# Patient Record
Sex: FEMALE | Race: WHITE | URBAN - METROPOLITAN AREA
[De-identification: names, ages, dates, MRNs, and addresses within clinical notes are randomized per-mention and may not be internally consistent; named-entity substitution may affect disease eponyms.]

---

## 2019-10-19 ENCOUNTER — EMERGENCY (EMERGENCY)
Facility: HOSPITAL | Age: 19
LOS: 0 days | Discharge: HOME | End: 2019-10-19
Attending: EMERGENCY MEDICINE | Admitting: EMERGENCY MEDICINE
Payer: COMMERCIAL

## 2019-10-19 VITALS
TEMPERATURE: 99 F | RESPIRATION RATE: 19 BRPM | SYSTOLIC BLOOD PRESSURE: 151 MMHG | OXYGEN SATURATION: 98 % | WEIGHT: 107.14 LBS | DIASTOLIC BLOOD PRESSURE: 88 MMHG | HEART RATE: 120 BPM

## 2019-10-19 VITALS — DIASTOLIC BLOOD PRESSURE: 78 MMHG | HEART RATE: 107 BPM | SYSTOLIC BLOOD PRESSURE: 123 MMHG | OXYGEN SATURATION: 100 %

## 2019-10-19 DIAGNOSIS — F41.9 ANXIETY DISORDER, UNSPECIFIED: ICD-10-CM

## 2019-10-19 DIAGNOSIS — R00.2 PALPITATIONS: ICD-10-CM

## 2019-10-19 PROCEDURE — 99283 EMERGENCY DEPT VISIT LOW MDM: CPT

## 2019-10-19 NOTE — ED PROVIDER NOTE - NS ED ROS FT
Constitutional: See HPI.  Eyes: No visual changes  ENMT: No neck pain   Cardiac: No cp + palpitations  Respiratory: No cough   GI: No nausea, vomiting, diarrhea or abdominal pain.  MS: No myalgia, muscle weakness, joint pain or back pain.  Psych: see hpi  Neuro: No headache   Skin: No skin rash.

## 2019-10-19 NOTE — ED PEDIATRIC TRIAGE NOTE - CHIEF COMPLAINT QUOTE
Patient brought in with mother for c/o increased anxiety and feeling like she cannot breath. Patient with history of anxiety on Zoloft recently changed to 100mg daily over the past month. Patient with no signs of respiratory distress. Speaking in full sentences in triage. Patient reports no homicidal or suicidal ideation.

## 2019-10-19 NOTE — ED PROVIDER NOTE - PROGRESS NOTE DETAILS
ATTENDING NOTE: I personally evaluated the patient. I reviewed the Resident’s note (as assigned above), and agree with the findings and plan except as documented in my note.   18 y/o F with PMH of anxiety on Zoloft, panic attacks, and asthma presents to ED s/p episode of anxiety. Pt endorses that she feels more stressed lately secondary to her social situation but is reluctant to provide details. Pt reports that “a lot is happening in my mind.”  States that her anxiety has improved while on Zoloft but she still has episodes of anxiety sometimes. Pt follows with Dr. Burks. Denies SI/HI; all other ROS is negative.   CONSTITUTIONAL: Well-developed; well-nourished; in no acute distress. NEURO: Alert, oriented, grossly unremarkable. PSYCH: Cooperative, appropriate.. SKIN: warm, dry. HEAD: Normocephalic; atraumatic. EYES: PERRL, EOMI, no conjunctival erythema. ENT: No nasal discharge; airway clear. NECK: Supple; non tender. CARD: S1, S2 normal;  Regular rate and rhythm. RESP: No wheezes, rales or rhonchi. ABD: soft non tender, non distended, no rebound or guarding. EXT: Normal ROM. LYMPH: No acute cervical lymphadenopathy. PSYCH: No affects, good eye contact, answers questions appropriately.   Plan for psych evaluation. patient no longer requests to see a physiatrist, stating feels better wants to go home.

## 2019-10-19 NOTE — ED PROVIDER NOTE - ATTENDING CONTRIBUTION TO CARE
I personally evaluated the patient. I reviewed the Resident’s note (as assigned above), and agree with the findings and plan except as documented in my note.     20 y/o F with PMH of anxiety on Zoloft, panic attacks, and asthma presents to ED s/p episode of anxiety. Pt endorses that she feels more stressed lately secondary to her social situation but is reluctant to provide details. Pt reports that “a lot is happening in my mind.”  States that her anxiety has improved while on Zoloft but she still has episodes of anxiety sometimes. Pt follows with Dr. Burks. Denies SI/HI; all other ROS is negative.     CONSTITUTIONAL: Well-developed; well-nourished; in no acute distress. NEURO: Alert, oriented, grossly unremarkable. PSYCH: Cooperative, appropriate.. SKIN: warm, dry. HEAD: Normocephalic; atraumatic. EYES: PERRL, EOMI, no conjunctival erythema. ENT: No nasal discharge; airway clear. NECK: Supple; non tender. CARD: S1, S2 normal;  Regular rate and rhythm. RESP: No wheezes, rales or rhonchi. ABD: soft non tender, non distended, no rebound or guarding. EXT: Normal ROM. LYMPH: No acute cervical lymphadenopathy. PSYCH: No affects, good eye contact, answers questions appropriately.   Plan for psych evaluation.

## 2019-10-19 NOTE — ED PROVIDER NOTE - PATIENT PORTAL LINK FT
You can access the FollowMyHealth Patient Portal offered by [facility] by registering at the following website: [email or web address]. By joining [employer]’s FollowMyHealth portal, you will also be able to view your health information using other applications (apps) compatible with our system.

## 2019-10-19 NOTE — ED PROVIDER NOTE - CARE PROVIDER_API CALL
Irene Burks (PS)  Psych  Inpatient  [address] [place], [place] [address]  Phone: [phone]  Fax: [phone]  Follow Up Time:

## 2019-10-19 NOTE — ED PROVIDER NOTE - NSFOLLOWUPINSTRUCTIONS_ED_ALL_ED_FT
Anxiety    Generalized anxiety disorder (JABIER) is a mental disorder. It is defined as anxiety that is not necessarily related to specific events or activities or is out of proportion to said events. Symptoms include restlessness, fatigue, difficulty concentrations, irritability and difficulty concentrating. It interferes with life functions, including relationships, work, and school. If you were started on a medication make sure to take exactly as prescribed and follow up with a psychiatrist.    SEEK IMMEDIATE MEDICAL CARE IF YOU HAVE THE FOLLOWING SYMPTOMS: thoughts about hurting killing yourself, thoughts about hurting or killing somebody else, hallucinations, or worsening depression.

## 2019-10-19 NOTE — ED PROVIDER NOTE - CLINICAL SUMMARY MEDICAL DECISION MAKING FREE TEXT BOX
Patient presents with episode of anxiety, feels like her typical episodes. Originally requesting ot talk to psychiatry but states that she would rather go home and see her person psychiatrist. no SI/HI. Return precautions discussed.
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
## 2020-07-01 ENCOUNTER — APPOINTMENT (OUTPATIENT)
Dept: OTOLARYNGOLOGY | Facility: CLINIC | Age: 20
End: 2020-07-01
Payer: COMMERCIAL

## 2020-07-01 PROBLEM — Z00.00 ENCOUNTER FOR PREVENTIVE HEALTH EXAMINATION: Status: ACTIVE | Noted: 2020-07-01

## 2020-07-01 PROCEDURE — 99203 OFFICE O/P NEW LOW 30 MIN: CPT

## 2020-07-01 NOTE — ASSESSMENT
[FreeTextEntry1] : Voice eval/videostroboscopy/GI f/u \par Head elevation at night\par PPI\par Endocrine evaluation\par f/u prn

## 2020-07-01 NOTE — REASON FOR VISIT
[FreeTextEntry2] : Referred by Dr Sharif with voice issue and thyroid nodules [Initial Consultation] : an initial consultation for

## 2020-07-01 NOTE — PHYSICAL EXAM
[de-identified] : prominent/borderline level II cervical lymph nodes bilaterally, thyroid slightly prominent and nodular [Midline] : trachea located in midline position [Normal] : no rashes

## 2020-07-01 NOTE — HISTORY OF PRESENT ILLNESS
[de-identified] : Performer/geller with several months voice issues [high register] and also thyroid nodules [US today 4 sub-centimeter thyroid nodule] and LN 1.5 cm with slight thyroid enlargment\par Hx esoiniphilic esophagitis\par Had recent laryngoscopy and scheduled for videostoboscopy to evaluate voice/vocal folds

## 2021-08-17 ENCOUNTER — TRANSCRIPTION ENCOUNTER (OUTPATIENT)
Age: 21
End: 2021-08-17

## 2022-03-18 ENCOUNTER — EMERGENCY (EMERGENCY)
Facility: HOSPITAL | Age: 22
LOS: 0 days | Discharge: HOME | End: 2022-03-19
Attending: STUDENT IN AN ORGANIZED HEALTH CARE EDUCATION/TRAINING PROGRAM | Admitting: STUDENT IN AN ORGANIZED HEALTH CARE EDUCATION/TRAINING PROGRAM
Payer: COMMERCIAL

## 2022-03-18 VITALS
HEART RATE: 127 BPM | SYSTOLIC BLOOD PRESSURE: 131 MMHG | TEMPERATURE: 99 F | WEIGHT: 110.01 LBS | RESPIRATION RATE: 18 BRPM | DIASTOLIC BLOOD PRESSURE: 71 MMHG | OXYGEN SATURATION: 100 %

## 2022-03-18 DIAGNOSIS — R51.9 HEADACHE, UNSPECIFIED: ICD-10-CM

## 2022-03-18 LAB
ALBUMIN SERPL ELPH-MCNC: 4.9 G/DL — SIGNIFICANT CHANGE UP (ref 3.5–5.2)
ALP SERPL-CCNC: 68 U/L — SIGNIFICANT CHANGE UP (ref 30–115)
ALT FLD-CCNC: 8 U/L — SIGNIFICANT CHANGE UP (ref 0–41)
ANION GAP SERPL CALC-SCNC: 11 MMOL/L — SIGNIFICANT CHANGE UP (ref 7–14)
AST SERPL-CCNC: 18 U/L — SIGNIFICANT CHANGE UP (ref 0–41)
BASOPHILS # BLD AUTO: 0.01 K/UL — SIGNIFICANT CHANGE UP (ref 0–0.2)
BASOPHILS NFR BLD AUTO: 0.1 % — SIGNIFICANT CHANGE UP (ref 0–1)
BILIRUB SERPL-MCNC: 0.2 MG/DL — SIGNIFICANT CHANGE UP (ref 0.2–1.2)
BUN SERPL-MCNC: 11 MG/DL — SIGNIFICANT CHANGE UP (ref 10–20)
CALCIUM SERPL-MCNC: 10.1 MG/DL — SIGNIFICANT CHANGE UP (ref 8.5–10.1)
CHLORIDE SERPL-SCNC: 103 MMOL/L — SIGNIFICANT CHANGE UP (ref 98–110)
CO2 SERPL-SCNC: 24 MMOL/L — SIGNIFICANT CHANGE UP (ref 17–32)
CREAT SERPL-MCNC: 0.6 MG/DL — LOW (ref 0.7–1.5)
EGFR: 130 ML/MIN/1.73M2 — SIGNIFICANT CHANGE UP
EOSINOPHIL # BLD AUTO: 0 K/UL — SIGNIFICANT CHANGE UP (ref 0–0.7)
EOSINOPHIL NFR BLD AUTO: 0 % — SIGNIFICANT CHANGE UP (ref 0–8)
GLUCOSE SERPL-MCNC: 104 MG/DL — HIGH (ref 70–99)
HCG SERPL QL: NEGATIVE — SIGNIFICANT CHANGE UP
HCT VFR BLD CALC: 33.8 % — LOW (ref 37–47)
HGB BLD-MCNC: 11.6 G/DL — LOW (ref 12–16)
IMM GRANULOCYTES NFR BLD AUTO: 0.2 % — SIGNIFICANT CHANGE UP (ref 0.1–0.3)
LYMPHOCYTES # BLD AUTO: 2.01 K/UL — SIGNIFICANT CHANGE UP (ref 1.2–3.4)
LYMPHOCYTES # BLD AUTO: 21.9 % — SIGNIFICANT CHANGE UP (ref 20.5–51.1)
MCHC RBC-ENTMCNC: 28.8 PG — SIGNIFICANT CHANGE UP (ref 27–31)
MCHC RBC-ENTMCNC: 34.3 G/DL — SIGNIFICANT CHANGE UP (ref 32–37)
MCV RBC AUTO: 83.9 FL — SIGNIFICANT CHANGE UP (ref 81–99)
MONOCYTES # BLD AUTO: 0.49 K/UL — SIGNIFICANT CHANGE UP (ref 0.1–0.6)
MONOCYTES NFR BLD AUTO: 5.3 % — SIGNIFICANT CHANGE UP (ref 1.7–9.3)
NEUTROPHILS # BLD AUTO: 6.65 K/UL — HIGH (ref 1.4–6.5)
NEUTROPHILS NFR BLD AUTO: 72.5 % — SIGNIFICANT CHANGE UP (ref 42.2–75.2)
NRBC # BLD: 0 /100 WBCS — SIGNIFICANT CHANGE UP (ref 0–0)
PLATELET # BLD AUTO: 247 K/UL — SIGNIFICANT CHANGE UP (ref 130–400)
POTASSIUM SERPL-MCNC: 4.7 MMOL/L — SIGNIFICANT CHANGE UP (ref 3.5–5)
POTASSIUM SERPL-SCNC: 4.7 MMOL/L — SIGNIFICANT CHANGE UP (ref 3.5–5)
PROT SERPL-MCNC: 7.2 G/DL — SIGNIFICANT CHANGE UP (ref 6–8)
RBC # BLD: 4.03 M/UL — LOW (ref 4.2–5.4)
RBC # FLD: 12.5 % — SIGNIFICANT CHANGE UP (ref 11.5–14.5)
SODIUM SERPL-SCNC: 138 MMOL/L — SIGNIFICANT CHANGE UP (ref 135–146)
WBC # BLD: 9.18 K/UL — SIGNIFICANT CHANGE UP (ref 4.8–10.8)
WBC # FLD AUTO: 9.18 K/UL — SIGNIFICANT CHANGE UP (ref 4.8–10.8)

## 2022-03-18 PROCEDURE — 70450 CT HEAD/BRAIN W/O DYE: CPT | Mod: 26,59,MA

## 2022-03-18 PROCEDURE — 93010 ELECTROCARDIOGRAM REPORT: CPT

## 2022-03-18 PROCEDURE — 99285 EMERGENCY DEPT VISIT HI MDM: CPT

## 2022-03-18 PROCEDURE — 70496 CT ANGIOGRAPHY HEAD: CPT | Mod: 26,MA

## 2022-03-18 RX ORDER — SODIUM CHLORIDE 9 MG/ML
1000 INJECTION, SOLUTION INTRAVENOUS ONCE
Refills: 0 | Status: COMPLETED | OUTPATIENT
Start: 2022-03-18 | End: 2022-03-18

## 2022-03-18 RX ORDER — HYDROXYZINE HCL 10 MG
25 TABLET ORAL ONCE
Refills: 0 | Status: COMPLETED | OUTPATIENT
Start: 2022-03-18 | End: 2022-03-18

## 2022-03-18 RX ADMIN — SODIUM CHLORIDE 1000 MILLILITER(S): 9 INJECTION, SOLUTION INTRAVENOUS at 21:38

## 2022-03-18 NOTE — ED ADULT TRIAGE NOTE - CHIEF COMPLAINT QUOTE
pt states has swollen optical gland with associated headaches and per opthalmologist today was told to come to ED for further testing

## 2022-03-18 NOTE — ED ADULT TRIAGE NOTE - WEIGHT IN KG
Last TSH WNL. A refill sent to pharmacy.  
Patient called and stated she needed a refill on her Euthyrox 75 mg for her thyroid. She is doing well with the new dosage but will need a refill as her previous prescription was for a lower daily dosage.   
49.9

## 2022-03-18 NOTE — ED PROVIDER NOTE - PROVIDER TOKENS
PROVIDER:[TOKEN:[[id number]],FOLLOWUP:[1-3 Days]],PROVIDER:[TOKEN:[[id number]],FOLLOWUP:[1-3 Days]]

## 2022-03-18 NOTE — ED PROVIDER NOTE - NSFOLLOWUPCLINICS_GEN_ALL_ED_FT
[facility]  Ophthalmolgy  [address], [address]  [place] [address]  Phone: [phone]  Fax:   Scheduled Appointment: 3/21/2022

## 2022-03-18 NOTE — ED PROVIDER NOTE - CLINICAL SUMMARY MEDICAL DECISION MAKING FREE TEXT BOX
20 yo female with PMH anxiety, hypothyroid, depression, asthma, tachycardia (has had cardiology eval, holter monitor etc, states its from her anxiety) presents to the ER from her optometrists for papilledema. Patient with unremarkable neurological exam. Labs and imaging reviewed. Neurological consulted. However, patient left AMA prior to final CT  venogram read and final neurology recommendations. Provided neurology follow up and strict return precautions discussed in detail.

## 2022-03-18 NOTE — ED ADULT NURSE NOTE - OBJECTIVE STATEMENT
patient reports she was sent in by her ophthalmologist for swollen optical gland. Patient complaints of headache

## 2022-03-18 NOTE — ED PROVIDER NOTE - NS ED ROS FT
GEN:  no fever, no chills, no generalized weakness  NEURO:  no headache, no dizziness, no vision changes  ENT: no sore throat, no runny nose  CV:  no chest pain  RESP:  no sob  GI:  no nausea, no vomiting, no abdominal pain, no diarrhea  MSK:  no joint pain, no edema  SKIN:  no rash, no bruising

## 2022-03-18 NOTE — ED PROVIDER NOTE - PATIENT PORTAL LINK FT
You can access the FollowMyHealth Patient Portal offered by  by registering at the following website: [email or web address]. By joining [facility]’s FollowMyHealth portal, you will also be able to view your health information using other applications (apps) compatible with our system.

## 2022-03-18 NOTE — ED PROVIDER NOTE - CARE PROVIDER_API CALL
Rufino Demarco)  Neurology  [address] [facility], [address]  [place] [address]  Phone: [phone]  Fax: [phone]  Follow Up Time: 1-3 Days    Clayton Luevano)  Neurology  [address] [place] [id number]  Phone: [phone]  Fax: [phone]  Follow Up Time: 1-3 Days

## 2022-03-18 NOTE — ED PROVIDER NOTE - PHYSICAL EXAMINATION
CONSTITUTIONAL: Well-developed; well-nourished; in no acute distress.   SKIN: warm, dry  HEAD: Normocephalic; atraumatic.  EYES: no conjunctival injection. PERRLA. EOMI. dilated pupils b/l. Bedside US optic nerve measurements 0.59 right and 0.7 left.  ENT: No nasal discharge; airway clear.  NECK: Supple; non tender.  CARD: S1, S2 normal; no murmurs, gallops, or rubs. Regular rate and rhythm.   RESP: No wheezes, rales or rhonchi.  ABD: soft ntnd  EXT: Normal ROM.  No clubbing, cyanosis or edema.   NEURO: Alert, oriented, grossly unremarkable.  PSYCH: Cooperative, appropriate.

## 2022-03-18 NOTE — ED PROVIDER NOTE - ATTENDING CONTRIBUTION TO CARE
20 yo female with PMH anxiety, hypothyroid, depression, asthma, tachycardia (has had cardiology eval, holter monitor etc, states its from her anxiety) presents to the ER from her optometrists office for LEFT optic nerve being dilated/swollen. PT states she went to see him just as a routine exam for a general checkup. Pt sent in today because she states she has intermittent headaches from allergies, tension headaches and when she gets her period. Pt currently has no HA. Pt denies any visual complaints/numbness/weakness/incontinence/vomiting/neck pain/trauma/dizziness/lightheadedness/ or GI complaints.     On exam bedside sono done (optic nerve) with slightly enlarged optic nerve on left and on right was borderline, very anxious, ncat, eyes dilated but reactive to light bilaterally, eomi, lung ctab, heart regular s1s2, abdomen soft nt/nd +BS, no mass, ext no edema or calf tenderness, neuro alert and oriented x 3, no motor or sensory deficits, no slurred speech, no ataxia, no facial droop.     A/P Pt is nearly tearful from her anxiety about her being sent to the ER,  which is why her HR is so elevated. Will check labs, CT head, consult neuro/optho and reassess. To consider atarax for anxiety management.     ALL: nkda  PMH anxiety, hypothyroid (thyroid nodule), depression  Meds Effexor, sertraline  SH no smoking or etoh  PMD Faravash  LMP March 1, regular periods.

## 2022-03-18 NOTE — ED PROVIDER NOTE - NSFOLLOWUPINSTRUCTIONS_ED_ALL_ED_FT
Papilledema is swelling of your optic nerve, which connects the eye and brain. This swelling is a reaction to a buildup of pressure in or around your brain that may have many causes.    Often, it's a warning sign of a serious medical condition that needs attention, such as a brain tumor or hemorrhage. But sometimes the pressure and swelling can't be traced to a specific problem. In that case, there are other ways to ease the swelling.    If you don't treat it, papilledema can lead to vision loss.    Causes  Your brain's network of nerves, blood, and fluid all fit snugly inside your skull. Because there's a limited amount of space, when tissues swell, something grows, or there's more liquid than normal, the pressure inside goes up and, in turn, can cause papilledema. That may happen because of:    A head injury  A brain or spinal cord tumor  Inflammation of the brain or any of its coverings, such as meningitis  Extremely high blood pressure  Bleeding in the brain  A blood clot or a problem within certain veins  Pus collecting from a brain infection  Problems with the flow or amount of fluid that runs through the brain and spinal cord  You can also get papilledema as a side effect of taking -- or stopping -- some medications, including:      Corticosteroids  Isotretinoin  Lithium  Tetracycline  When there's no apparent reason for high pressure inside your skull, the condition is called idiopathic intracranial hypertension (IIH).    It happens to about 1 out of 100,000 people, but it's 20 times more likely among obese women in their childbearing years. As obesity rates rise, so does the IIH rate. Also, suddenly gaining an additional 5% to 15% of your body weight raises the odds, regardless of your starting weight.      The exact link to being overweight isn't clear. It's possible that belly fat increases pressure in the chest and starts a chain reaction to the brain.    Symptoms and Complications  You may not have any symptoms in the early stage of papilledema. Your doctor may discover it when they see optic nerve swelling during a routine eye exam.    SUGGESTED        As it progresses, you're likely to have vision problems, usually in both eyes. It's common to have blurred or double vision, and lose your vision for a few seconds at a time. Other symptoms are headache, queasiness, and throwing up.    With IIH, some of these symptoms are more noticeable. You could get a headache every day and feel it on both sides of your head. The headaches may not always be the same intensity, but they do get worse as you keep getting them. You might hear throbbing in your head.    Untreated papilledema can lead to serious eye problems, starting with the loss of your peripheral, or side, vision. In later stages, your vision can become completely blurred. Some people go blind in one or both eyes.    Diagnosis  Eye doctors use a tool called an ophthalmoscope to look inside the back of the eyes and diagnose papilledema. An imaging test, such as an MRI, can provide more details and possibly show what's causing the pressure in your brain. Later on, MRIs can measure how well treatment is working.    Your doctor may want you to have a lumbar puncture, also known as a spinal tap. This test measures the pressure of the cerebrospinal fluid that runs through your brain and spinal cord. Further tests on a sample of this fluid can help diagnose an infection or tumor.      Treatment  If tests reveal a medical problem, treating it should cure papilledema as well. For instance, you might need antibiotics for a brain infection, surgery to drain an abscess or remove a tumor, or medicine to dissolve a blood clot.    Your doctor may be able to switch a problem medication.    Otherwise, your symptoms will likely guide your treatment. With slight papilledema and no symptoms, your doctor might simply keep checking you and do regular testing to spot any vision problems as soon as possible.    If doctors rule out a life-threatening cause for your papilledema, they might recommend weight loss and a diuretic (water pill) called acetazolamide. This drug helps bring down the pressure inside your head by lessening the amount of fluid in your body as well as the amount of fluid your brain makes.    SUGGESTED        You can take a pain reliever for your headaches. Topiramate (Topamax), used for migraines and seizures, also helps some people lose weight and lowers the pressure inside the skull.    Removing some spinal fluid often eases the pressure and symptoms. Sometimes, just the fluid needed for testing is enough to make a difference. Or your doctor might want to do regular spinal taps to keep the pressure down.      If your vision gets worse despite all these efforts, there are different types of brain surgery to relieve pressure and protect your optic nerve.    Unless your doctor finds a specific cause and you treat it successfully, papilledema can return.

## 2022-03-18 NOTE — ED PROVIDER NOTE - PROGRESS NOTE DETAILS
CP: ophthalmology consulted. recommend CT venogram and follow up with neurology. pending CTV. CP: ophthalmology consulted. recommend CT venogram and follow up with neurology. pending CTV. Neurology consulted as well (TEAMS message sent and acknowledged) Maryse: currently headache free CP: ophthalmology consulted. recommend CT venogram and follow up with neurology. pending CTV. Neurology consulted as well (TEAMS message sent and acknowledged). Discussed with ophthalmology resident Deshaun who stated she would schedule a follow up at the ophtho clinic for Monday. CP: neurology resident at bedside CP: attempted to reach neurology multiple times. Resident states he is unable to reach attending and has attempted to call >5 times. Discussed with patient and patient's mother who would like to go home. Given strict return precautions and risks of AMA. Given follow up to ophtho and neurology. They verbalized understanding and is agreeable to plan. DC: Patient requested to leave ED; denies headache or any neurological symptoms bedside. Told patient that we do not have final CT read and there was a concern that there may be an abnormality on the CT venogram and no final neurological recommendations- explained the risks of missing a thrombus including stroke, morbidity, and mortality. Patient requested to leave stating she would follow up with neurology and return to ED should she get any symptoms.

## 2022-03-18 NOTE — ED PROVIDER NOTE - OBJECTIVE STATEMENT
22 yo female, PMHx of asthma, anxiety, depression, presents from eye doctor. Patient went for routine vision checked, was dilated, and was informed her optic nerve appeared swollen. Of note, patient has been having intermittent generalized headaches that are typical for her. Has no complaints at this time other than feeling anxious. Denies vision changes, dizziness, nausea, vomiting, trauma.

## 2022-03-19 VITALS
RESPIRATION RATE: 18 BRPM | TEMPERATURE: 99 F | DIASTOLIC BLOOD PRESSURE: 72 MMHG | OXYGEN SATURATION: 100 % | HEART RATE: 82 BPM | SYSTOLIC BLOOD PRESSURE: 127 MMHG

## 2022-03-19 NOTE — CONSULT NOTE ADULT - SUBJECTIVE AND OBJECTIVE BOX
Neurology Consult    Patient is a 21y old  Female with PMH of asthma, anxiety, and depression who presents with no neurological complaint. The patient was sent to the ED by her eye doctor with findings of edema of optic nerve. History of intermittent headaches. The patient mentioned that her headache is infrequent, happens mainly with her period, frontal, pressure like, less than 5/10, and it has been happening since she had her first period ten years ago, with no change in its nature or severity. The headache is not associated with nausea, vomiting, photophobia, or phonophobia. The headache usually will last one hour and occasionally will need Tylenol. The headache never woke her up from sleep, and it does not have any specific time of the day, with no relation to position.   The patient denied any blurry vision or new change in her vision. She mentioned that she went to the eye doctor for a routine check up, when they told her that she needs to go the ED.    The patient is not on any OCP,       HPI:      PAST MEDICAL & SURGICAL HISTORY:      FAMILY HISTORY:  None    Social History: (-) x 3    Allergies    No Known Allergies    Intolerances        MEDICATIONS  (STANDING):    MEDICATIONS  (PRN):      Review of systems:    Constitutional: as per HPI  Eyes: No eye pain or discharge  ENMT:  No difficulty hearing; No sinus or throat pain  Neck: No pain or stiffness  Respiratory: No cough, wheezing, chills or hemoptysis  Cardiovascular: No chest pain, palpitations, shortness of breath, dyspnea on exertion  Gastrointestinal: No abdominal pain, nausea, vomiting or hematemesis; No diarrhea or constipation.   Genitourinary: No dysuria, frequency, hematuria or incontinence  Neurological: As per HPI  Skin: No rashes or lesions   Endocrine: No heat or cold intolerance; No hair loss  Musculoskeletal: No joint pain or swelling  Psychiatric: No depression, anxiety, mood swings  Heme/Lymph: No easy bruising or bleeding gums    Vital Signs Last 24 Hrs  T(C): 37 (18 Mar 2022 19:16), Max: 37 (18 Mar 2022 19:16)  T(F): 98.6 (18 Mar 2022 19:16), Max: 98.6 (18 Mar 2022 19:16)  HR: 127 (18 Mar 2022 19:16) (127 - 127)  BP: 131/71 (18 Mar 2022 19:16) (131/71 - 131/71)  BP(mean): --  RR: 18 (18 Mar 2022 19:16) (18 - 18)  SpO2: 100% (18 Mar 2022 19:16) (100% - 100%)    Examination:  General:  Appearance is consistent with chronologic age.  No abnormal facies.  Gross skin survey within normal limits.    Cognitive/Language:  The patient is oriented to person, place, time and date.  Recent and remote memory intact.  Fund of knowledge is intact and normal.  Language with normal repetition, comprehension and naming.  Nondysarthric.    Eyes: intact VA, VFF, I was not able to appreciate any papilledema  EOMI w/o nystagmus, skew or reported double vision.  PERRL.  No ptosis/weakness of eyelid closure.    Face:  Facial sensation normal V1 - 3, no facial asymmetry.    Ears/Nose/Throat:  Hearing grossly intact b/l.  Palate elevates midline.  Tongue and uvula midline.   Motor examination:   Normal tone, bulk and range of motion.  No tenderness, twitching, tremors or involuntary movements.  Formal Muscle Strength Testing: (MRC grade R/L) 5/5 UE; 5/5 LE.  No observable drift.  Reflexes:   2+ b/l biceps, triceps, brachioradialis, patella and Achilles.  Plantar response downgoing b/l.  Jaw jerk, Roberto Carlos, clonus absent.  Sensory examination:   Intact to light touch and pinprick, pain, temperature and proprioception and vibration in all extremities.  Cerebellum:   FTN/HKS intact with normal ALEXANDRA in all limbs.  No dysmetria or dysdiadokinesia.    Gait narrow based and normal.    Respiratory:  no audible wheezing or inspiratory stridor.  no use of accessory muscles.   Cardiac: pulse palpable, no audible bruits  Abdomen: supple, no guarding, no TTP    Labs:   CBC Full  -  ( 18 Mar 2022 20:05 )  WBC Count : 9.18 K/uL  RBC Count : 4.03 M/uL  Hemoglobin : 11.6 g/dL  Hematocrit : 33.8 %  Platelet Count - Automated : 247 K/uL  Mean Cell Volume : 83.9 fL  Mean Cell Hemoglobin : 28.8 pg  Mean Cell Hemoglobin Concentration : 34.3 g/dL  Auto Neutrophil # : 6.65 K/uL  Auto Lymphocyte # : 2.01 K/uL  Auto Monocyte # : 0.49 K/uL  Auto Eosinophil # : 0.00 K/uL  Auto Basophil # : 0.01 K/uL  Auto Neutrophil % : 72.5 %  Auto Lymphocyte % : 21.9 %  Auto Monocyte % : 5.3 %  Auto Eosinophil % : 0.0 %  Auto Basophil % : 0.1 %    03-18    138  |  103  |  11  ----------------------------<  104<H>  4.7   |  24  |  0.6<L>    Ca    10.1      18 Mar 2022 20:05    TPro  7.2  /  Alb  4.9  /  TBili  0.2  /  DBili  x   /  AST  18  /  ALT  8   /  AlkPhos  68  03-18    LIVER FUNCTIONS - ( 18 Mar 2022 20:05 )  Alb: 4.9 g/dL / Pro: 7.2 g/dL / ALK PHOS: 68 U/L / ALT: 8 U/L / AST: 18 U/L / GGT: x                   Neuroimaging:  NCHCT: CT Head No Cont:   ACC: [id number] EXAM:  CT BRAIN                          PROCEDURE DATE:  03/18/2022          INTERPRETATION:  Clinical History / Reason for exam: Headache.    Technique: Noncontrast head CT.  Contiguous unenhanced CT axial images of   the head from the base to the vertex with coronal and sagittal reformats.    Comparison: None.    Findings:    Streak artifact from the patient's earrings limits assessment of the   skull base.    The ventricles and cortical sulci are normal for patient's stated age.   There is no evidence for acute intracranial hemorrhage, midline shift,   mass effect, or loss of gray-white differentiation to suggest acute   territorial infarction. No extra-axial fluid collection is seen.    Questionable focal hyperdensity is noted within the frontal aspect of the   superior sagittal sinus (series 2, image 18).    The paranasal sinuses and mastoids are well-aerated.    IMPRESSION:  Questionable focal hyperdensity within the frontal aspect of the superior   sagittal sinus. Consider MRV brain to exclude sinus venous thrombosis.    Dr. Gunjan Pike discussed preliminary findings with JOYCE FLORES on   3/18/2022 9:16 PM with readback.    --- End of Report ---          GUNJAN PIKE MD; Resident Radiologist  This document hasbeen electronically signed.  MARIAM LOPEZ MD; Attending Radiologist  This document has been electronically signed. Mar 18 2022  9:26PM (03-18-22 @ 20:47)      03-19-22 @ 00:16

## 2022-03-19 NOTE — CONSULT NOTE ADULT - CONSULT REASON
Patient sent in from eye doctor with findings of edema of optic nerve. History of intermittent headaches. We ordered a ct head. Bedside ultrasound shows borderline thickness bilaterally

## 2022-03-19 NOTE — ED ADULT NURSE REASSESSMENT NOTE - NS ED NURSE REASSESS COMMENT FT1
patient requested to leave AMA. Patient educated on the risks and dangers of leaving ama including possible death. Patient verbalized understanding. IV access removed pressure bandage applied, no bleeding noted. Patient A&Ox4, ambulates with a steady gait and left ED with her mother. Patient in stable condition and nad.

## 2022-03-19 NOTE — CONSULT NOTE ADULT - ASSESSMENT
This 21y old female, presenting to ED with neurological complain, and was referred by her eye doctor for finding edema in her optic nerve found on a routine test of her eyes. The eye doctor also text the mother that the visual acuity 20/20 and 25/20   The patient headache history is not suggestive of increased intracranial pressure, and she never had visual complain.   CTH report mentioned Questionable focal hyperdensity within the frontal aspect of the superior   sagittal sinus  CT venogram report: Questionable focal hyperdensity within the frontal aspect of the superior sagittal sinus. The rest of the Sinuses are normal   Clinically, the patient is less likely to have cerebral sinus thrombosis, or increased intracranial pressure      Plan:   MR orbit w/wo ivis can be done as outpatient   No clinical indication for LP at the time being       - Additional diagnostic and/or therapeutic recommendations, if any, are to follow after review the attending Dr. Demarco   Thank you for sharing this patient with me.  Should you have any other further questions or concerns, please do not hesitate to contact me.

## 2022-10-26 NOTE — ED ADULT NURSE NOTE - SUICIDE SCREENING DEPRESSION
Spoke to patient - name and  verified. 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 Her body hurts due to work and weather. She stats at the end of the day her entire body is in pain. She said Tylenol makes her tired (she takes it after work) and it does help. Please review message below and advise. Negative

## 2024-06-26 ENCOUNTER — EMERGENCY (EMERGENCY)
Facility: HOSPITAL | Age: 24
LOS: 0 days | Discharge: HOME | End: 2024-06-26
Attending: EMERGENCY MEDICINE
Payer: COMMERCIAL

## 2024-06-26 VITALS
HEART RATE: 52 BPM | TEMPERATURE: 97 F | DIASTOLIC BLOOD PRESSURE: 67 MMHG | OXYGEN SATURATION: 98 % | RESPIRATION RATE: 18 BRPM | SYSTOLIC BLOOD PRESSURE: 102 MMHG

## 2024-06-26 DIAGNOSIS — R55 SYNCOPE AND COLLAPSE: ICD-10-CM

## 2024-06-26 DIAGNOSIS — R11.10 VOMITING, UNSPECIFIED: ICD-10-CM

## 2024-06-26 DIAGNOSIS — F10.129 ALCOHOL ABUSE WITH INTOXICATION, UNSPECIFIED: ICD-10-CM

## 2024-06-26 LAB
ALBUMIN SERPL ELPH-MCNC: 4.6 G/DL — SIGNIFICANT CHANGE UP (ref 3.5–5.2)
ALP SERPL-CCNC: 59 U/L — SIGNIFICANT CHANGE UP (ref 30–115)
ALT FLD-CCNC: 11 U/L — SIGNIFICANT CHANGE UP (ref 0–41)
ANION GAP SERPL CALC-SCNC: 10 MMOL/L — SIGNIFICANT CHANGE UP (ref 7–14)
APAP SERPL-MCNC: <5 UG/ML — LOW (ref 10–30)
AST SERPL-CCNC: 24 U/L — SIGNIFICANT CHANGE UP (ref 0–41)
BASOPHILS # BLD AUTO: 0.04 K/UL — SIGNIFICANT CHANGE UP (ref 0–0.2)
BASOPHILS NFR BLD AUTO: 0.5 % — SIGNIFICANT CHANGE UP (ref 0–1)
BILIRUB SERPL-MCNC: <0.2 MG/DL — SIGNIFICANT CHANGE UP (ref 0.2–1.2)
BUN SERPL-MCNC: 7 MG/DL — LOW (ref 10–20)
CALCIUM SERPL-MCNC: 9.1 MG/DL — SIGNIFICANT CHANGE UP (ref 8.4–10.5)
CHLORIDE SERPL-SCNC: 103 MMOL/L — SIGNIFICANT CHANGE UP (ref 98–110)
CO2 SERPL-SCNC: 24 MMOL/L — SIGNIFICANT CHANGE UP (ref 17–32)
CREAT SERPL-MCNC: 0.7 MG/DL — SIGNIFICANT CHANGE UP (ref 0.7–1.5)
EGFR: 123 ML/MIN/1.73M2 — SIGNIFICANT CHANGE UP
EOSINOPHIL # BLD AUTO: 0.08 K/UL — SIGNIFICANT CHANGE UP (ref 0–0.7)
EOSINOPHIL NFR BLD AUTO: 1 % — SIGNIFICANT CHANGE UP (ref 0–8)
ETHANOL SERPL-MCNC: 246 MG/DL — HIGH
GLUCOSE SERPL-MCNC: 81 MG/DL — SIGNIFICANT CHANGE UP (ref 70–99)
HCG SERPL QL: NEGATIVE — SIGNIFICANT CHANGE UP
HCT VFR BLD CALC: 33.1 % — LOW (ref 37–47)
HGB BLD-MCNC: 11 G/DL — LOW (ref 12–16)
IMM GRANULOCYTES NFR BLD AUTO: 0.1 % — SIGNIFICANT CHANGE UP (ref 0.1–0.3)
LYMPHOCYTES # BLD AUTO: 2.52 K/UL — SIGNIFICANT CHANGE UP (ref 1.2–3.4)
LYMPHOCYTES # BLD AUTO: 31.3 % — SIGNIFICANT CHANGE UP (ref 20.5–51.1)
MAGNESIUM SERPL-MCNC: 2.2 MG/DL — SIGNIFICANT CHANGE UP (ref 1.8–2.4)
MCHC RBC-ENTMCNC: 26.4 PG — LOW (ref 27–31)
MCHC RBC-ENTMCNC: 33.2 G/DL — SIGNIFICANT CHANGE UP (ref 32–37)
MCV RBC AUTO: 79.4 FL — LOW (ref 81–99)
MONOCYTES # BLD AUTO: 0.59 K/UL — SIGNIFICANT CHANGE UP (ref 0.1–0.6)
MONOCYTES NFR BLD AUTO: 7.3 % — SIGNIFICANT CHANGE UP (ref 1.7–9.3)
NEUTROPHILS # BLD AUTO: 4.8 K/UL — SIGNIFICANT CHANGE UP (ref 1.4–6.5)
NEUTROPHILS NFR BLD AUTO: 59.8 % — SIGNIFICANT CHANGE UP (ref 42.2–75.2)
NRBC # BLD: 0 /100 WBCS — SIGNIFICANT CHANGE UP (ref 0–0)
PLATELET # BLD AUTO: 293 K/UL — SIGNIFICANT CHANGE UP (ref 130–400)
PMV BLD: 11 FL — HIGH (ref 7.4–10.4)
POTASSIUM SERPL-MCNC: 4.3 MMOL/L — SIGNIFICANT CHANGE UP (ref 3.5–5)
POTASSIUM SERPL-SCNC: 4.3 MMOL/L — SIGNIFICANT CHANGE UP (ref 3.5–5)
PROT SERPL-MCNC: 7.3 G/DL — SIGNIFICANT CHANGE UP (ref 6–8)
RBC # BLD: 4.17 M/UL — LOW (ref 4.2–5.4)
RBC # FLD: 15.5 % — HIGH (ref 11.5–14.5)
SALICYLATES SERPL-MCNC: <0.3 MG/DL — LOW (ref 4–30)
SODIUM SERPL-SCNC: 137 MMOL/L — SIGNIFICANT CHANGE UP (ref 135–146)
WBC # BLD: 8.04 K/UL — SIGNIFICANT CHANGE UP (ref 4.8–10.8)
WBC # FLD AUTO: 8.04 K/UL — SIGNIFICANT CHANGE UP (ref 4.8–10.8)

## 2024-06-26 PROCEDURE — 80053 COMPREHEN METABOLIC PANEL: CPT

## 2024-06-26 PROCEDURE — 93010 ELECTROCARDIOGRAM REPORT: CPT

## 2024-06-26 PROCEDURE — 36415 COLL VENOUS BLD VENIPUNCTURE: CPT

## 2024-06-26 PROCEDURE — 96374 THER/PROPH/DIAG INJ IV PUSH: CPT

## 2024-06-26 PROCEDURE — 83735 ASSAY OF MAGNESIUM: CPT

## 2024-06-26 PROCEDURE — 99285 EMERGENCY DEPT VISIT HI MDM: CPT

## 2024-06-26 PROCEDURE — 80307 DRUG TEST PRSMV CHEM ANLYZR: CPT

## 2024-06-26 PROCEDURE — 96375 TX/PRO/DX INJ NEW DRUG ADDON: CPT

## 2024-06-26 PROCEDURE — 93005 ELECTROCARDIOGRAM TRACING: CPT

## 2024-06-26 PROCEDURE — 84703 CHORIONIC GONADOTROPIN ASSAY: CPT

## 2024-06-26 PROCEDURE — 99285 EMERGENCY DEPT VISIT HI MDM: CPT | Mod: 25

## 2024-06-26 PROCEDURE — 85025 COMPLETE CBC W/AUTO DIFF WBC: CPT

## 2024-06-26 RX ORDER — SODIUM CHLORIDE 9 MG/ML
1000 INJECTION INTRAMUSCULAR; INTRAVENOUS; SUBCUTANEOUS ONCE
Refills: 0 | Status: COMPLETED | OUTPATIENT
Start: 2024-06-26 | End: 2024-06-26

## 2024-06-26 RX ORDER — LINACLOTIDE 145 UG/1
0 CAPSULE, GELATIN COATED ORAL
Refills: 0 | DISCHARGE

## 2024-06-26 RX ORDER — FAMOTIDINE 10 MG/ML
20 INJECTION INTRAVENOUS ONCE
Refills: 0 | Status: COMPLETED | OUTPATIENT
Start: 2024-06-26 | End: 2024-06-26

## 2024-06-26 RX ORDER — SERTRALINE 25 MG/1
0 TABLET, FILM COATED ORAL
Refills: 0 | DISCHARGE

## 2024-06-26 RX ORDER — ONDANSETRON 8 MG/1
4 TABLET, FILM COATED ORAL ONCE
Refills: 0 | Status: COMPLETED | OUTPATIENT
Start: 2024-06-26 | End: 2024-06-26

## 2024-06-26 RX ORDER — BUPROPION HYDROCHLORIDE 150 MG/1
0 TABLET, EXTENDED RELEASE ORAL
Refills: 0 | DISCHARGE

## 2024-06-26 RX ADMIN — SODIUM CHLORIDE 2000 MILLILITER(S): 9 INJECTION INTRAMUSCULAR; INTRAVENOUS; SUBCUTANEOUS at 19:26

## 2024-06-26 RX ADMIN — ONDANSETRON 4 MILLIGRAM(S): 8 TABLET, FILM COATED ORAL at 19:26

## 2024-06-26 RX ADMIN — FAMOTIDINE 20 MILLIGRAM(S): 10 INJECTION INTRAVENOUS at 19:26

## 2024-06-26 NOTE — ED ADULT NURSE NOTE - NSFALLHARMRISKINTERV_ED_ALL_ED
Assistance OOB with selected safe patient handling equipment if applicable/Assistance with ambulation/Communicate risk of Fall with Harm to all staff, patient, and family/Monitor gait and stability/Monitor for mental status changes and reorient to person, place, and time, as needed/Provide visual cue: red socks, yellow wristband, yellow gown, etc/Reinforce activity limits and safety measures with patient and family/Toileting schedule using arm’s reach rule for commode and bathroom/Use of alarms - bed, stretcher, chair and/or video monitoring/Bed in lowest position, wheels locked, appropriate side rails in place/Call bell, personal items and telephone in reach/Instruct patient to call for assistance before getting out of bed/chair/stretcher/Non-slip footwear applied when patient is off stretcher/[place] to call system/Physically safe environment - no spills, clutter or unnecessary equipment/Purposeful Proactive Rounding/Room/bathroom lighting operational, light cord in reach

## 2024-06-26 NOTE — ED PROVIDER NOTE - OBJECTIVE STATEMENT
24 year old female no sig past medical history comes to emergency room for drinking too much and vomiting. As per patient she had 2 drinks and was vomiting on the floor. EMS state the received call from restaurant and on arrival found patient on the ground vomiting. witness say she laid down on the ground and this she may have passed out, patient denies passing out. no head injury.

## 2024-06-26 NOTE — ED PROVIDER NOTE - NSFOLLOWUPINSTRUCTIONS_ED_ALL_ED_FT
Follow up with your primary care doctor in 1-2 days    Alcohol Intoxication  Alcohol intoxication occurs when a person no longer thinks clearly or functions well (becomes impaired) after drinking alcohol. Intoxication can occur with even one drink. The level of impairment depends on:    The amount of alcohol the person had.  The person's age, gender, and weight.  How often the person drinks.  Whether the person has other medical conditions, such as diabetes, seizures, or a heart condition.    Alcohol intoxication can range in severity from mild to severe. The condition can be dangerous, especially when caused by drinking large amounts of alcohol in a short period of time (binge drinking) or if the person also took certain prescription medicines or recreational drugs.    What are the signs or symptoms?  Symptoms of mild alcohol intoxication include:    Feeling relaxed or sleepy.  Mild difficulty with:    Coordination.  Speech.  Memory.  Attention.      Symptoms of moderate alcohol intoxication include:    Extreme emotions, such as anger or sadness.  Moderate difficulty with:    Coordination.  Speech.  Memory.  Attention.      Symptoms of severe alcohol intoxication include:    Passing out.  Vomiting.  Confusion.  Slow breathing.  Coma.  Severe difficulty with:    Coordination.  Speech.  Memory.  Attention.      Intoxication, especially in people who are not exposed to alcohol often can progress from mild to severe quickly, and may even cause coma or death.    How is this diagnosed?  This condition may be diagnosed based on:    A medical history.  A physical exam.  A blood test that measures the concentration of alcohol in the blood (blood alcohol content, or GABY).  Whether there is a smell of alcohol on the breath.    Your health care provider will ask you how much alcohol you drank and what kind of alcohol you had.    How is this treated?  Usually, treatment is not needed for this condition. Most of the effects of alcohol are temporary and go away as the alcohol naturally leaves the body. Your health care provider may recommend monitoring until the alcohol level starts to drop and it is safe to go home. You may also get fluids through an IV tube to help prevent dehydration. If the intoxication is severe, a breathing machine called a ventilator may be needed to support your breathing.    Follow these instructions at home:  Do not drive after drinking alcohol.  Have someone stay with you while you are intoxicated. You should not be left alone.  Stay hydrated. Drink enough fluid to keep your urine clear or pale yellow.  Avoid caffeine because it can dehydrate you.  ImageTake over-the-counter and prescription medicines only as told by your health care provider.  How is this prevented?  To prevent alcohol intoxication:    Limit alcohol intake to no more than 1 drink a day for nonpregnant women and 2 drinks a day for men. One drink equals 12 oz of beer, 5 oz of wine, or 1½ oz of hard liquor.  Do not drink alcohol on an empty stomach.  Avoid drinking alcohol if:    You are under the legal drinking age.  You are pregnant or may be pregnant.  You are taking medicines that should not be taken with alcohol.  Your drinking causes your medical condition to get worse.  You need to drive or perform activities that require your attention.  You have substance use disorder.      To prevent potentially serious complications of alcohol intoxication, seek immediate medical care if you or someone you know has signs of moderate or severe alcohol intoxication. These include:    Moderate or severe difficulty with:    Coordination.  Speech.  Memory.  Attention.    Passing out.  Confusion.  Vomiting.    Do not leave someone alone if he or she is intoxicated.    Contact a health care provider if:  You do not feel better after a few days.  You are having problems at work, at school, or at home due to drinking.  Get help right away if:  You become shaky when you try to stop drinking.  You shake uncontrollably (have a seizure).  You vomit blood. Blood in vomit may look bright red, or it may look like coffee grounds.  You have blood in your stool. Blood in stool may be bright red, or it may make stool appear black and tarry and make it smell bad.  You become light-headed or you faint.  If you ever feel like you may hurt yourself or others, or have thoughts about taking your own life, get help right away. You can go to your nearest emergency department or call:     Your local emergency services (911 in the [place]   A suicide crisis helpline, such as the National Suicide Prevention Lifeline at 1-565.892.9978. This is open 24 hours a day.     This information is not intended to replace advice given to you by your health care provider. Make sure you discuss any questions you have with your health care provider.

## 2024-06-26 NOTE — ED PROVIDER NOTE - PATIENT PORTAL LINK FT
You can access the FollowMyHealth Patient Portal offered by [facility] by registering at the following website: [email or web address]. By joining [employer]’s FollowMyHealth portal, you will also be able to view your health information using other applications (apps) compatible with our system.

## 2024-06-26 NOTE — ED PROVIDER NOTE - PHYSICAL EXAMINATION
Physical Exam    Vital Signs: I have reviewed the initial vital signs.  Constitutional: well-nourished, appears stated age, no acute distress  Eyes: Conjunctiva pink, Sclera clear, PERRLA, EOMI.  Cardiovascular: S1 and S2, regular rate, regular rhythm, well-perfused extremities, radial pulses equal and 2+  Respiratory: unlabored respiratory effort, clear to auscultation bilaterally no wheezing, rales and rhonchi  Gastrointestinal: soft, non-tender abdomen, no pulsatile mass, normal bowl sounds  Musculoskeletal: supple neck, no lower extremity edema, no midline tenderness  Integumentary: warm, dry, no rash no signs of trauma  Neurologic: awake, alert, moving all exterminates

## 2024-06-26 NOTE — ED PROVIDER NOTE - ATTENDING APP SHARED VISIT CONTRIBUTION OF CARE
patient bibems intoxicated having vomited, exam nonfocal, observed, feels better, po tolerant, ambulatory with steady gait, will d/c to mother.